# Patient Record
Sex: MALE | Race: WHITE | Employment: FULL TIME | URBAN - METROPOLITAN AREA
[De-identification: names, ages, dates, MRNs, and addresses within clinical notes are randomized per-mention and may not be internally consistent; named-entity substitution may affect disease eponyms.]

---
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
## 2021-05-10 DIAGNOSIS — M79.671 RIGHT FOOT PAIN: ICD-10-CM

## 2021-05-10 DIAGNOSIS — M25.571 RIGHT ANKLE PAIN, UNSPECIFIED CHRONICITY: Primary | ICD-10-CM

## 2021-05-11 ENCOUNTER — HOSPITAL ENCOUNTER (OUTPATIENT)
Dept: GENERAL RADIOLOGY | Age: 21
Discharge: HOME | End: 2021-05-11
Payer: COMMERCIAL

## 2021-05-11 ENCOUNTER — HOSPITAL ENCOUNTER (OUTPATIENT)
Age: 21
Discharge: HOME | End: 2021-05-11
Payer: COMMERCIAL

## 2021-05-11 DIAGNOSIS — M25.571 RIGHT ANKLE PAIN, UNSPECIFIED CHRONICITY: ICD-10-CM

## 2021-05-11 DIAGNOSIS — M79.671 RIGHT FOOT PAIN: ICD-10-CM

## 2021-05-11 PROCEDURE — 73610 X-RAY EXAM OF ANKLE: CPT

## 2021-05-11 PROCEDURE — 73630 X-RAY EXAM OF FOOT: CPT

## 2021-05-12 ENCOUNTER — OFFICE VISIT (OUTPATIENT)
Dept: ORTHOPEDIC SURGERY | Age: 21
End: 2021-05-12
Payer: COMMERCIAL

## 2021-05-12 DIAGNOSIS — S93.401A SPRAIN OF RIGHT ANKLE, UNSPECIFIED LIGAMENT, INITIAL ENCOUNTER: Primary | ICD-10-CM

## 2021-05-12 PROCEDURE — L1902 AFO ANKLE GAUNTLET PRE OTS: HCPCS | Performed by: ORTHOPAEDIC SURGERY

## 2021-05-12 PROCEDURE — 99213 OFFICE O/P EST LOW 20 MIN: CPT | Performed by: ORTHOPAEDIC SURGERY

## 2021-05-12 NOTE — LETTER
[facility] [address] [address]  Phone: [phone]  Fax: [phone]    Simon Matias MD        May 12, 2021     Patient: Arnulfo Pringle   YOB: 2000   Date of Visit: 5/12/2021       To Whom it May Concern:    Arnulfo Pringle was seen in my clinic on 5/12/2021. He may return to work on 05/17/2021 with no restrictions. Please excuse any absence. If you have any questions or concerns, please don't hesitate to call.     Sincerely,         Simon Matias MD

## 2021-05-12 NOTE — PROGRESS NOTES
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 He has had multiple ankle sprains in the past.         No past medical history on file. Past Surgical History:   Procedure Laterality Date    FEMUR FRACTURE SURGERY Right 2011       Family History   Problem Relation Age of Onset    Heart Disease Maternal Grandfather     Heart Disease Paternal Grandmother        Social History     Socioeconomic History    Marital status: Single     Spouse name: Not on file    Number of children: Not on file    Years of education: Not on file    Highest education level: Not on file   Occupational History    Not on file   Social Needs    Financial resource strain: Not on file    Food insecurity     Worry: Not on file     Inability: Not on file    Transportation needs     Medical: Not on file     Non-medical: Not on file   Tobacco Use    Smoking status: Never Smoker    Smokeless tobacco: Never Used   Substance and Sexual Activity    Alcohol use: No    Drug use: No    Sexual activity: Not on file   Lifestyle    Physical activity     Days per week: Not on file     Minutes per session: Not on file    Stress: Not on file   Relationships    Social connections     Talks on phone: Not on file     Gets together: Not on file     Attends [employer] service: Not on file     Active member of club or organization: Not on file     Attends meetings of clubs or organizations: Not on file     Relationship status: Not on file    Intimate partner violence     Fear of current or ex partner: Not on file     Emotionally abused: Not on file     Physically abused: Not on file     Forced sexual activity: Not on file   Other Topics Concern    Not on file   Social History Narrative    Not on file       No current outpatient medications on file.      No current facility-administered medications for this Viral Syndrome in Children   WHAT YOU NEED TO KNOW:   Viral syndrome is a general term used for a viral infection that has no clear cause  Your child may have a fever, muscle aches, or vomiting  Other symptoms include a cough, chest congestion, or nasal congestion (stuffy nose)  DISCHARGE INSTRUCTIONS:   Call 911 for the following:   · Your child has a seizure  · Your child has trouble breathing or he is breathing very fast     · Your child is leaning forward and drooling  · Your child's lips, tongue, or nails, are blue  · Your child cannot be woken  Return to the emergency department if:   · Your child complains of a stiff neck and a bad headache  · Your child has a dry mouth, cracked lips, cries without tears, or is dizzy  · Your child's soft spot on his head is sunken in or bulging out  · Your child coughs up blood or thick yellow, or green, mucus  · Your child is very weak or confused  · Your child stops urinating or urinates a lot less than normal      · Your child has severe abdominal pain or his abdomen is larger than normal   Contact your child's healthcare provider if:   · Your child has a fever for more than 3 days  · Your child's symptoms do not get better with treatment  · Your child's appetite is poor or he has poor feeding  · Your child has a rash, ear pain  or a sore throat  · Your child has pain when he urinates  · Your child is irritable and fussy, and you cannot calm him down  · You have questions or concerns about your child's condition or care  Medicines: Your child may need the following:  · Acetaminophen  decreases pain and fever  It is available without a doctor's order  Ask how much medicine to give your child and how often to give it  Follow directions  Acetaminophen can cause liver damage if not taken correctly  · NSAIDs , such as ibuprofen, help decrease swelling, pain, and fever   This medicine is available with or without a doctor's order  NSAIDs can cause stomach bleeding or kidney problems in certain people  If your child takes blood thinner medicine, always ask if NSAIDs are safe for him  Always read the medicine label and follow directions  Do not give these medicines to children under 10months of age without direction from your child's healthcare provider  · Do not give aspirin to children under 25years of age  Your child could develop Reye syndrome if he takes aspirin  Reye syndrome can cause life-threatening brain and liver damage  Check your child's medicine labels for aspirin, salicylates, or oil of wintergreen  · Give your child's medicine as directed  Contact your child's healthcare provider if you think the medicine is not working as expected  Tell him or her if your child is allergic to any medicine  Keep a current list of the medicines, vitamins, and herbs your child takes  Include the amounts, and when, how, and why they are taken  Bring the list or the medicines in their containers to follow-up visits  Carry your child's medicine list with you in case of an emergency  Follow up with your child's healthcare provider as directed:  Write down your questions so you remember to ask them during your visits  Care for your child at home:   · Use a cool-mist humidifier  to help your child breathe easier if he has nasal or chest congestion  Ask his healthcare provider how to use a cool-mist humidifier  · Give saline nose drops  to your baby if he has nasal congestion  Place a few saline drops into each nostril  Gently insert a suction bulb to remove the mucus  · Give your child plenty of liquids  to prevent dehydration  Examples include water, ice pops, flavored gelatin, and broth  Ask how much liquid your child should drink each day and which liquids are best for him  You may need to give your child an oral electrolyte solution if he is vomiting or has diarrhea  Do not give your child liquids with caffeine  visit. No Known Allergies    Vital signs: There were no vitals taken for this visit. Constitution: Patient cooperative with examination today. Well-developed, well-nourished in no acute distress. Neuro: Alert & oriented x 3,  no focal motor or sensory deficits noted. Eyes: sclera clear, atraumatic  Ears: Normal external ear  Mouth:  No perioral lesions  Pulm: Respirations unlabored and regular  Pulse: Extremities well-perfused. 2+ peripheral pulses   Skin: Warm, no ulcerations      Right ankle exam    The patient ambulates with a mildly antalgic gait. The patient can ambulate 3 steps without assistance. Swelling is present over the lateral aspect of the ankle inferior to the malleolus. There is mild tenderness present over the anteromedial aspect of the ankle. There is tenderness palpation over the ATFL. There is no tenderness over the AITF L. There is no pain with forced external rotation or with forced ankle dorsiflexion. There is pain with inversion. There is limitation of ankle dorsiflexion and plantarflexion. Peroneal muscle weakness is 4+/5. Mild instability is present. Anterior drawer sign is increased compared to the opposite side. No peroneal tendon subluxation is present. The skin is warm, dry and well perfused. Sensation is intact to light touch. Right ankle x-ray. AP, lateral and mortise views were obtained of the right ankle. The joint spaces are well preserved and no acute bony abnormalities are present. No acute bony abnormalities are present. Impression: Right lateral ankle sprain. The etiology, natural history and treatment options for this disorder were discussed. The roles of activity modification, anti-inflammatory use, injections, bracing, physical therapy and surgical interventions were all described to the patient and questions were answered. Home exercises bracing follow-up as needed.     Time spent for evaluation of patient formulation Times pace Intelligent Technology , Inc  or Patricio Conrad  The above information is an  only  It is not intended as medical advice for individual conditions or treatments  Talk to your doctor, nurse or pharmacist before following any medical regimen to see if it is safe and effective for you  treatment plan and patient education 30 minutes. I personally reviewed the patient's pain scale, review of systems, family history, social history, past medical history, allergies and medications. Review of systems was collected today, reviewed and is included in the medical record. It is available under the media tab. Go Gomez MD  Sports Medicine, Knee and Shoulder Surgery    This dictation was performed with a verbal recognition program [facility]) and it was checked for errors. It is possible that there are still dictated errors within this office note. If so, please bring any errors to my attention for an addendum. All efforts were made to ensure that this office note is accurate.

## 2022-09-15 ENCOUNTER — TELEPHONE (OUTPATIENT)
Dept: ORTHOPEDIC SURGERY | Age: 22
End: 2022-09-15

## 2022-09-15 NOTE — TELEPHONE ENCOUNTER
General Question     Subject: APPOINTMENT CHANGE   Patient and /or Facility Request: [id number] [facility] Number: [phone]    LEFT PATIENT A MESSAGE FOR A APPOINTMENT CHANGE PER MICHAEL PATIENT NEEDS TO BE SEEN IN OFFICE TOMORROW CALL BACK NUMBER WAS GIVING ON VOICEMAIL

## 2022-09-20 ENCOUNTER — OFFICE VISIT (OUTPATIENT)
Dept: ORTHOPEDIC SURGERY | Age: 22
End: 2022-09-20
Payer: COMMERCIAL

## 2022-09-20 VITALS — WEIGHT: 150 LBS | HEIGHT: 71 IN | BODY MASS INDEX: 21 KG/M2

## 2022-09-20 DIAGNOSIS — M25.522 LEFT ELBOW PAIN: Primary | ICD-10-CM

## 2022-09-20 DIAGNOSIS — S50.02XA CONTUSION OF LEFT ELBOW, INITIAL ENCOUNTER: ICD-10-CM

## 2022-09-20 PROCEDURE — 99214 OFFICE O/P EST MOD 30 MIN: CPT | Performed by: ORTHOPAEDIC SURGERY

## 2022-09-20 NOTE — PROGRESS NOTES
Date:  2022    Name:  Luisa Garcia  Address:  [facility] [address]    :  2000      Age:   25 y.o.    SSN:  [id number]      Medical Record Number:  [id number]    Reason for Visit:    Chief Complaint    Elbow Injury (Old patient / new problem left elbow )      DOS:2022     HPI: Luisa Garcia is a 25 y.o. male here today for evaluation of left elbow injury. Patient states that 5 days ago he was skateboarding, fell off a ramp backwards and fell directly onto his left elbow. He was seen in the emergency department for evaluation of his left elbow. X-ray was obtained showing some intra-articular swelling concerning for fracture but no definitive fracture. CT scan was performed with a similar result. He was placed in a sugar-tong splint and referred to our clinic. He has been compliant with his splint ever since his original injury and he does feel significantly better today compared to his original injury. Pain Assessment  Location of Pain: Elbow  Location Modifiers: Left  Severity of Pain: 3  Quality of Pain: Aching  Duration of Pain: A few minutes  Frequency of Pain: Intermittent  Aggravating Factors: Straightening  Limiting Behavior: Some  Relieving Factors: Rest  Result of Injury: Yes  Work-Related Injury: No  Are there other pain locations you wish to document?: No  ROS: Review of systems reviewed from Patient History Form completed today and available in the patient's chart under the Media tab. History reviewed. No pertinent past medical history.      Past Surgical History:   Procedure Laterality Date    FEMUR FRACTURE SURGERY Right        Family History   Problem Relation Age of Onset    Heart Disease Maternal Grandfather     Heart Disease Paternal Grandmother        Social History     Socioeconomic History    Marital status: Single     Spouse name: None    Number of children: None    Years of education: None    Highest education level: None   Tobacco Use Smoking status: Never    Smokeless tobacco: Never   Substance and Sexual Activity    Alcohol use: No    Drug use: No       No current outpatient medications on file. No current facility-administered medications for this visit. No Known Allergies    Vital signs:  Ht 5' 11\" (1.803 m)   Wt 150 lb (68 kg)   BMI 20.92 kg/m²        Left Elbow Examination:    Inspection:    Normal alignment. No swelling. Palpation:    mild tenderness posterior elbow    Range of Motion:      0-135 degrees. Strength:       5/5 in all muscle groups    Instability testing:  Stable to varus and valgus stress    Vascular exam:    Skin warm and well-perfused. Neurologic exam:     Sensation intact to light    Right Elbow Comparison Examination:    Inspection:    Normal alignment. No swelling. Palpation:     No tenderness to palpation    Range of Motion:      0-135 degrees. Strength:       5/5 in all muscle groups    Instability testing:  Stable to varus and valgus stress    Vascular exam:    Skin warm and well-perfused. Neurologic exam:     Sensation intact to light      Diagnostics:  Radiology:     Pertinent imaging was obtained, interpreted, and reviewed with the patient today, both images and report. Left elbow x-ray:    AP, lateral and oblique views were obtained  and reviewed of the left elbow. Impression: No acute fracture or dislocation. No osseous abnormalities. There is no appreciable soft tissue swelling or joint effusion. There are no lytic or blastic lesions. Left Elbow CT on 09/14/2022     Small elbow joint effusion which in the setting of trauma raises suspicion for a nondisplaced    fracture. A displaced fracture line is not visible. Left Elbow xray on 09/14/2022         Possible left elbow joint effusion, not well evaluated on lateral radiograph. No evidence of    acute fracture.   Consider repeat lateral elbow radiograph or consider further evaluation with    left elbow CT depending on degree of clinical suspicion. Office Procedures:  Orders Placed This Encounter   Procedures    XR ELBOW LEFT (MIN 3 VIEWS)     Standing Status:   Future     Number of Occurrences:   1     Standing Expiration Date:   9/20/2023     Order Specific Question:   Reason for exam:     Answer:   Pain       Assessment: 26 yo male with left elbow contusion    Plan: Pertinent imaging was reviewed. The etiology, natural history, and treatment options for the disorder were discussed. The roles of activity medication, antiinflammatories, injections, bracing, physical therapy, and surgical interventions were all described to the patient and questions were answered. Patient suffered an injury skateboarding where he landed onto his elbow. Patient is stable on exam, has full active and passive ROM. Functionally he is able to form a push-up off the wall with no pain. I believe he is a contusion. This will heal on its own. Gentle range of motion and increase activity as tolerated. Follow-up if no improvement or worsening symptoms    Ryley Abbottgo is in agreement with this plan. All questions were answered to patient's satisfaction and was encouraged to call with any further questions. Total time spent for evaluation, education, and development of treatment plan: 35 minutes    Charly Galarza, [place]  9/20/2022    During this exam, I, Charly Galarza PA-C, functioned as a scribe for Dr. Kishor Bailey. The history taking and physical examination were performed by Dr. Kishor Bailey. All counseling during the appointment was performed between the patient and Dr. Kishor Bailey. 9/20/2022 12:01 PM    This dictation was performed with a verbal recognition program ([employer]) and it was checked for errors. It is possible that there are still dictated errors within this office note. If so, please bring any areas to my attention for an addendum.   All efforts were made to ensure that this office note is accurate. I attest that I met personally with the patient, performed the described exam, reviewed the radiographic studies and medical records associated with this patient and supervised the services that are described above.      Yumiko Padron MD